# Patient Record
Sex: MALE | Race: BLACK OR AFRICAN AMERICAN | Employment: OTHER | URBAN - METROPOLITAN AREA
[De-identification: names, ages, dates, MRNs, and addresses within clinical notes are randomized per-mention and may not be internally consistent; named-entity substitution may affect disease eponyms.]

---

## 2021-07-28 ENCOUNTER — HOSPITAL ENCOUNTER (EMERGENCY)
Age: 71
Discharge: HOME | End: 2021-07-28
Attending: EMERGENCY MEDICINE
Payer: MEDICARE

## 2021-07-28 VITALS
TEMPERATURE: 97.8 F | BODY MASS INDEX: 21.84 KG/M2 | DIASTOLIC BLOOD PRESSURE: 72 MMHG | RESPIRATION RATE: 16 BRPM | HEART RATE: 50 BPM | SYSTOLIC BLOOD PRESSURE: 161 MMHG | WEIGHT: 161 LBS | OXYGEN SATURATION: 100 %

## 2021-07-28 DIAGNOSIS — N50.89 MALE GENITAL LESION: ICD-10-CM

## 2021-07-28 DIAGNOSIS — N39.0 ACUTE UTI: Primary | ICD-10-CM

## 2021-07-28 LAB
APPEARANCE UR: CLEAR
BILIRUB UR QL: NEGATIVE
COLOR UR: YELLOW
GLUCOSE UR STRIP.AUTO-MCNC: NEGATIVE MG/DL
HGB UR QL STRIP: ABNORMAL
KETONES UR QL STRIP.AUTO: NEGATIVE MG/DL
LEUKOCYTE ESTERASE UR QL STRIP.AUTO: ABNORMAL
NITRITE UR QL STRIP.AUTO: NEGATIVE
PH UR STRIP: 5.5 [PH] (ref 5–8)
PROT UR STRIP-MCNC: NEGATIVE MG/DL
RBC #/AREA URNS HPF: NORMAL /HPF (ref 0–5)
SP GR UR REFRACTOMETRY: 1.02 (ref 1–1.03)
UROBILINOGEN UR QL STRIP.AUTO: 1 EU/DL (ref 0.2–1)
WBC URNS QL MICRO: NORMAL /HPF (ref 0–4)

## 2021-07-28 PROCEDURE — 86780 TREPONEMA PALLIDUM: CPT

## 2021-07-28 PROCEDURE — 87591 N.GONORRHOEAE DNA AMP PROB: CPT

## 2021-07-28 PROCEDURE — 87661 TRICHOMONAS VAGINALIS AMPLIF: CPT

## 2021-07-28 PROCEDURE — 99283 EMERGENCY DEPT VISIT LOW MDM: CPT

## 2021-07-28 PROCEDURE — 74011250636 HC RX REV CODE- 250/636: Performed by: EMERGENCY MEDICINE

## 2021-07-28 PROCEDURE — 96372 THER/PROPH/DIAG INJ SC/IM: CPT

## 2021-07-28 PROCEDURE — 81001 URINALYSIS AUTO W/SCOPE: CPT

## 2021-07-28 PROCEDURE — 87255 GENET VIRUS ISOLATE HSV: CPT

## 2021-07-28 PROCEDURE — 86592 SYPHILIS TEST NON-TREP QUAL: CPT

## 2021-07-28 RX ORDER — CEPHALEXIN 500 MG/1
500 CAPSULE ORAL 2 TIMES DAILY
Qty: 14 CAPSULE | Refills: 0 | Status: SHIPPED | OUTPATIENT
Start: 2021-07-28 | End: 2021-08-04

## 2021-07-28 RX ORDER — VALACYCLOVIR HYDROCHLORIDE 1 G/1
1000 TABLET, FILM COATED ORAL 3 TIMES DAILY
Qty: 30 TABLET | Refills: 0 | Status: SHIPPED | OUTPATIENT
Start: 2021-07-28 | End: 2021-08-07

## 2021-07-28 RX ADMIN — PENICILLIN G BENZATHINE 2.4 MILLION UNITS: 2400000 INJECTION, SUSPENSION INTRAMUSCULAR at 12:47

## 2021-07-28 NOTE — ROUTINE PROCESS
Thompson Dye is a 72 y.o. male that was discharged in stable. Pt was accompanied by self. Pt is driving. The patients diagnosis, condition and treatment were explained to  patient and aftercare instructions were given. The patient verbalized understanding. Patient armband removed and shredded.

## 2021-07-28 NOTE — LETTER
[facility]   Emergency Departments      8/28/2021  [address] [address] [address] [place] [address]    Dear Adilene Castrejon were recently seen in the Emergency Department of [facility] and had lab work performed. We would like to discuss these results with you. Please call [facility] Emergency Department as soon as possible at [phone] to speak with one of our providers.      Sincerely,       Sondra Suarez PA-C

## 2021-07-28 NOTE — ED PROVIDER NOTES
EMERGENCY DEPARTMENT HISTORY AND PHYSICAL EXAM    Date: 7/28/2021  Patient Name: Jerald Gonzales    History of Presenting Illness     Chief Complaint   Patient presents with    Urinary Pain         History Provided By: Patient    Additional History (Context): Jerald Gonzales is a 72 y.o. male with No significant past medical history who presents with urea and genital lesions x10 days. He said he was with his partner for 15 years until they took a break last year because she was sleeping around with other people. They got back together in May now he has been exposed to trichomonas and for the past 10 days has had 2 areas of pain on his penis. He is also having pain when he voids. Denies rash elsewhere where inguinal lymphadenopathy or new arthralgias. PCP: None    Current Outpatient Medications   Medication Sig Dispense Refill    valACYclovir (VALTREX) 1 gram tablet Take 1 Tablet by mouth three (3) times daily for 10 days. 30 Tablet 0    cephALEXin (Keflex) 500 mg capsule Take 1 Capsule by mouth two (2) times a day for 7 days. 14 Capsule 0       Past History     Past Medical History:  History reviewed. No pertinent past medical history. Past Surgical History:  Past Surgical History:   Procedure Laterality Date    HX ORTHOPAEDIC      Rt knee    HX TONSILLECTOMY         Family History:  History reviewed. No pertinent family history. Social History:  Social History     Tobacco Use    Smoking status: Current Every Day Smoker     Packs/day: 2.00     Years: 40.00     Pack years: 80.00   Substance Use Topics    Alcohol use: Yes     Alcohol/week: 0.0 standard drinks     Types: 5 - 6 Cans of beer per week    Drug use: No       Allergies:  No Known Allergies      Review of Systems   Review of Systems   Constitutional: Negative for fever and unexpected weight change. Genitourinary: Positive for dysuria and genital sores. Negative for discharge.      All Other Systems Negative  Physical Exam     Vitals: 07/28/21 1210   BP: (!) 161/72   Pulse: (!) 50   Resp: 16   Temp: 97.8 °F (36.6 °C)   SpO2: 100%   Weight: 73 kg (161 lb)     Physical Exam  Vitals and nursing note reviewed. Constitutional:       General: He is not in acute distress. Appearance: He is well-developed. He is not ill-appearing, toxic-appearing or diaphoretic. HENT:      Head: Normocephalic and atraumatic. Neck:      Thyroid: No thyromegaly. Vascular: No carotid bruit. Trachea: No tracheal deviation. Cardiovascular:      Rate and Rhythm: Normal rate and regular rhythm. Heart sounds: Normal heart sounds. No murmur heard. No friction rub. No gallop. Pulmonary:      Effort: Pulmonary effort is normal. No respiratory distress. Breath sounds: Normal breath sounds. No stridor. No wheezing or rales. Chest:      Chest wall: No tenderness. Abdominal:      General: There is no distension. Palpations: Abdomen is soft. There is no mass. Tenderness: There is no abdominal tenderness. There is no guarding or rebound. Genitourinary:     Comments: At the margin of the penis shaft on the dorsal aspect merging onto the glans penis there is a slight separation of contour line with a very tender chancre type lesion with 2 smaller surrounding sores. Additionally at the dorsal aspect of his urethra there is erosion and chancre that is also tender. Each area is at least 3 to 4 mm in size. Musculoskeletal:         General: Normal range of motion. Cervical back: Normal range of motion and neck supple. Skin:     General: Skin is warm and dry. Coloration: Skin is not pale. Neurological:      Mental Status: He is alert. Psychiatric:         Speech: Speech normal.         Behavior: Behavior normal.         Thought Content:  Thought content normal.         Judgment: Judgment normal.            Diagnostic Study Results     Labs -     Recent Results (from the past 12 hour(s))   URINALYSIS W/ RFLX MICROSCOPIC Collection Time: 07/28/21 12:15 PM   Result Value Ref Range    Color YELLOW      Appearance CLEAR      Specific gravity 1.016 1.005 - 1.030      pH (UA) 5.5 5.0 - 8.0      Protein Negative NEG mg/dL    Glucose Negative NEG mg/dL    Ketone Negative NEG mg/dL    Bilirubin Negative NEG      Blood SMALL (A) NEG      Urobilinogen 1.0 0.2 - 1.0 EU/dL    Nitrites Negative NEG      Leukocyte Esterase MODERATE (A) NEG     URINE MICROSCOPIC ONLY    Collection Time: 07/28/21 12:15 PM   Result Value Ref Range    WBC 11 to 20 0 - 4 /hpf    RBC 4 to 10 0 - 5 /hpf       Radiologic Studies -   No orders to display     CT Results  (Last 48 hours)    None        CXR Results  (Last 48 hours)    None            Medical Decision Making   I am the first provider for this patient. I reviewed the vital signs, available nursing notes, past medical history, past surgical history, family history and social history. Vital Signs-Reviewed the patient's vital signs. Records Reviewed: Nursing Notes    Procedures:  Procedures    Provider Notes (Medical Decision Making): [facility]. Has a UTI. 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 Have sent for HSV swab serum T palladium sent and given prophylaxis for syphilis here. Will prescribe Valtrex and Keflex. Pt counseled against sexual intercourse and to inform partner of visit here today. MED RECONCILIATION:  No current facility-administered medications for this encounter. Current Outpatient Medications   Medication Sig    valACYclovir (VALTREX) 1 gram tablet Take 1 Tablet by mouth three (3) times daily for 10 days.  cephALEXin (Keflex) 500 mg capsule Take 1 Capsule by mouth two (2) times a day for 7 days. Disposition:  home    DISCHARGE NOTE:   12:58 PM    Pt has been reexamined. Patient has no new complaints, changes, or physical findings. Care plan outlined and precautions discussed. Results of labs were reviewed with the patient.  All medications were reviewed with the patient; will d/c home with keflex, valtrex. All of pt's questions and concerns were addressed. Patient was instructed and agrees to follow up with urology, HD, as well as to return to the ED upon further deterioration. Patient is ready to go home. Follow-up Information     Follow up With Specialties Details Why Contact Info    [facility]  Schedule an appointment as soon as possible for a visit in 1 day  [address] [facility] [address]  Schedule an appointment as soon as possible for a visit in 1 day  [address] [address]  [facility]  Schedule an appointment as soon as possible for a visit in 1 day  [place] [facility]          Current Discharge Medication List      START taking these medications    Details   valACYclovir (VALTREX) 1 gram tablet Take 1 Tablet by mouth three (3) times daily for 10 days. Qty: 30 Tablet, Refills: 0  Start date: 7/28/2021, End date: 8/7/2021      cephALEXin (Keflex) 500 mg capsule Take 1 Capsule by mouth two (2) times a day for 7 days. Qty: 14 Capsule, Refills: 0  Start date: 7/28/2021, End date: 8/4/2021               Diagnosis     Clinical Impression:   1. Acute UTI    2.  Male genital lesion

## 2021-07-30 LAB
RPR SER QL: ABNORMAL
T PALLIDUM AB SER QL IA: ABNORMAL

## 2021-08-01 LAB
C TRACH RRNA SPEC QL NAA+PROBE: NEGATIVE
HSV SPEC CULT: NORMAL
N GONORRHOEA RRNA SPEC QL NAA+PROBE: NEGATIVE
SPECIMEN SOURCE: NORMAL
SPECIMEN SOURCE: NORMAL
T VAGINALIS RRNA VAG QL NAA+PROBE: NEGATIVE

## 2021-08-28 NOTE — PROGRESS NOTES
Attempted to call the pt at 7:57 PM.  Patient was treated with valacyclovir but was not treated for syphilis at the time of the ED visit. No visible notes in the chart related to notification of this result. Patient needs to return to the emergency department and be treated for syphilis. No answer was obtained on the phone number in his chart. We will send a letter to the patient's home address on file.  Sondra Suarez PA-C

## 2021-09-08 NOTE — ED NOTES
Pt returned phone call. Notes he was notified of positive syphilis result last month, [facility]. Notes he received repeat blood work and has follow-up with them this week as well. No further questions or concerns.

## 2023-10-28 ENCOUNTER — HOSPITAL ENCOUNTER (EMERGENCY)
Facility: HOSPITAL | Age: 73
Discharge: HOME | End: 2023-10-29
Attending: STUDENT IN AN ORGANIZED HEALTH CARE EDUCATION/TRAINING PROGRAM

## 2023-10-28 ENCOUNTER — APPOINTMENT (OUTPATIENT)
Facility: HOSPITAL | Age: 73
End: 2023-10-28

## 2023-10-28 VITALS
OXYGEN SATURATION: 98 % | DIASTOLIC BLOOD PRESSURE: 68 MMHG | RESPIRATION RATE: 16 BRPM | SYSTOLIC BLOOD PRESSURE: 129 MMHG | HEART RATE: 101 BPM | TEMPERATURE: 98.1 F

## 2023-10-28 DIAGNOSIS — Z23 TETANUS-DIPHTHERIA VACCINATION ADMINISTERED AT CURRENT VISIT: ICD-10-CM

## 2023-10-28 DIAGNOSIS — S01.81XA FACIAL LACERATION, INITIAL ENCOUNTER: Primary | ICD-10-CM

## 2023-10-28 PROCEDURE — 12011 RPR F/E/E/N/L/M 2.5 CM/<: CPT

## 2023-10-28 PROCEDURE — 99284 EMERGENCY DEPT VISIT MOD MDM: CPT

## 2023-10-28 ASSESSMENT — PAIN DESCRIPTION - LOCATION: LOCATION: HEAD

## 2023-10-28 ASSESSMENT — PAIN - FUNCTIONAL ASSESSMENT: PAIN_FUNCTIONAL_ASSESSMENT: 0-10

## 2023-10-28 ASSESSMENT — PAIN SCALES - GENERAL: PAINLEVEL_OUTOF10: 3

## 2023-10-29 ENCOUNTER — APPOINTMENT (OUTPATIENT)
Facility: HOSPITAL | Age: 73
End: 2023-10-29

## 2023-10-29 PROCEDURE — 70450 CT HEAD/BRAIN W/O DYE: CPT

## 2023-10-29 PROCEDURE — 90714 TD VACC NO PRESV 7 YRS+ IM: CPT

## 2023-10-29 PROCEDURE — 6360000002 HC RX W HCPCS

## 2023-10-29 PROCEDURE — 90471 IMMUNIZATION ADMIN: CPT

## 2023-10-29 RX ADMIN — CLOSTRIDIUM TETANI TOXOID ANTIGEN (FORMALDEHYDE INACTIVATED) AND CORYNEBACTERIUM DIPHTHERIAE TOXOID ANTIGEN (FORMALDEHYDE INACTIVATED) 0.5 ML: 5; 2 INJECTION, SUSPENSION INTRAMUSCULAR at 00:30

## 2023-10-29 ASSESSMENT — ENCOUNTER SYMPTOMS
RESPIRATORY NEGATIVE: 1
EYES NEGATIVE: 1
GASTROINTESTINAL NEGATIVE: 1

## 2023-10-29 NOTE — ED NOTES
Patient returned from [facility], [facility] RN  10/29/23 6621
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
## 2023-11-13 ENCOUNTER — HOSPITAL ENCOUNTER (EMERGENCY)
Facility: HOSPITAL | Age: 73
Discharge: HOME | End: 2023-11-13
Attending: EMERGENCY MEDICINE
Payer: MEDICARE

## 2023-11-13 VITALS
WEIGHT: 168 LBS | BODY MASS INDEX: 22.75 KG/M2 | DIASTOLIC BLOOD PRESSURE: 76 MMHG | HEIGHT: 72 IN | SYSTOLIC BLOOD PRESSURE: 156 MMHG | TEMPERATURE: 98.3 F | OXYGEN SATURATION: 99 % | HEART RATE: 45 BPM | RESPIRATION RATE: 18 BRPM

## 2023-11-13 DIAGNOSIS — Z48.02 ENCOUNTER FOR REMOVAL OF SUTURES: Primary | ICD-10-CM

## 2023-11-13 DIAGNOSIS — R03.0 ELEVATED BLOOD PRESSURE READING: ICD-10-CM

## 2023-11-13 DIAGNOSIS — R00.1 ASYMPTOMATIC BRADYCARDIA: ICD-10-CM

## 2023-11-13 LAB
EKG ATRIAL RATE: 45 BPM
EKG DIAGNOSIS: NORMAL
EKG P AXIS: 102 DEGREES
EKG P-R INTERVAL: 134 MS
EKG Q-T INTERVAL: 432 MS
EKG QRS DURATION: 80 MS
EKG QTC CALCULATION (BAZETT): 373 MS
EKG R AXIS: 64 DEGREES
EKG T AXIS: 57 DEGREES
EKG VENTRICULAR RATE: 45 BPM

## 2023-11-13 PROCEDURE — 93005 ELECTROCARDIOGRAM TRACING: CPT | Performed by: EMERGENCY MEDICINE

## 2023-11-13 PROCEDURE — 93010 ELECTROCARDIOGRAM REPORT: CPT | Performed by: INTERNAL MEDICINE

## 2023-11-13 PROCEDURE — 99283 EMERGENCY DEPT VISIT LOW MDM: CPT

## 2023-11-13 NOTE — ED TRIAGE NOTES
PT in ED with c/o needing his sutures removed that were placed 10/28. Pts HR currently 45. EKG completed.  Pt currently Asymptomatic

## 2023-11-13 NOTE — CARE COORDINATION
Patient has transitional care follow up with [address] [place], [place] [address]T[facility] on 11/22/2023 at 9:45 am.

## 2023-11-13 NOTE — ED PROVIDER NOTES
Emergency Department Treatment Report    Patient: Annabel Kanner Age: 71 y.o. Sex: male    YOB: 1950 Admit Date: 11/13/2023 PCP: No primary care provider on file. MRN: [id number]  CSN: [id number]     Room: [address] [place] [address] Time Dictated: 3:40 PM        Chief Complaint   Chief Complaint   Patient presents with    Suture / Staple Removal       History of Present Illness   Annabel Kanner is a 71 y.o. male with no past medical history who presents to the ED with the chief complaint of suture removal. Patient drove to ED himself. Currently does not have primary care to follow up with. No current medications. Denies any symptoms of fever, lightheadedness, constipation, dizziness, visual disturbances. Review of Systems   Constitutional: Negative for fever, chills, and fatigue. HENT: Negative for congestion, sore throat, rhinorrhea, sneezing and neck stiffness   Eyes: Negative for discharge and redness. Respiratory: Negative for  shortness of breath, wheezing   Cardiovascular: Negative for chest pain, palpitations   Gastrointestinal: Negative for nausea, vomiting, abdominal pain, constipation, diarrhea and blood in stool. Genitourinary: Negative for dysuria, urgency, frequency, hematuria, flank pain, decreased urine volume, discharge,   Musculoskeletal: Negative for myalgias or joint pain . Skin: Negative for rash or lesions . Neurological: Negative weakness, light-headedness, numbness and headaches. Past Medical/Surgical History     Past Medical History:   Diagnosis Date    Arthropathy      Past Surgical History:   Procedure Laterality Date    ORTHOPEDIC SURGERY      Rt knee    TONSILLECTOMY         Social History     Social History     Socioeconomic History    Marital status: Single   Tobacco Use    Smoking status: Every Day     Packs/day: 2     Types: Cigarettes   Substance and Sexual Activity    Alcohol use:  Yes     Alcohol/week: 0.0 standard drinks of alcohol    Drug use: No       Family

## 2024-07-08 ENCOUNTER — HOSPITAL ENCOUNTER (EMERGENCY)
Facility: HOSPITAL | Age: 74
Discharge: HOME | End: 2024-07-08
Attending: STUDENT IN AN ORGANIZED HEALTH CARE EDUCATION/TRAINING PROGRAM
Payer: MEDICARE

## 2024-07-08 VITALS
OXYGEN SATURATION: 99 % | WEIGHT: 171 LBS | HEIGHT: 72 IN | HEART RATE: 37 BPM | SYSTOLIC BLOOD PRESSURE: 149 MMHG | RESPIRATION RATE: 18 BRPM | TEMPERATURE: 98 F | DIASTOLIC BLOOD PRESSURE: 64 MMHG | BODY MASS INDEX: 23.16 KG/M2

## 2024-07-08 DIAGNOSIS — R10.9 LEFT SIDED ABDOMINAL PAIN: Primary | ICD-10-CM

## 2024-07-08 LAB
ALBUMIN SERPL-MCNC: 3.9 G/DL (ref 3.4–5)
ALBUMIN/GLOB SERPL: 1.1 (ref 0.8–1.7)
ALP SERPL-CCNC: 56 U/L (ref 45–117)
ALT SERPL-CCNC: 24 U/L (ref 16–61)
ANION GAP SERPL CALC-SCNC: 5 MMOL/L (ref 3–18)
APPEARANCE UR: CLEAR
AST SERPL-CCNC: 19 U/L (ref 10–38)
BASOPHILS # BLD: 0.1 K/UL (ref 0–0.1)
BASOPHILS NFR BLD: 1 % (ref 0–2)
BILIRUB SERPL-MCNC: 1.1 MG/DL (ref 0.2–1)
BILIRUB UR QL: NEGATIVE
BUN SERPL-MCNC: 11 MG/DL (ref 7–18)
BUN/CREAT SERPL: 11 (ref 12–20)
CALCIUM SERPL-MCNC: 10.3 MG/DL (ref 8.5–10.1)
CHLORIDE SERPL-SCNC: 109 MMOL/L (ref 100–111)
CO2 SERPL-SCNC: 26 MMOL/L (ref 21–32)
COLOR UR: YELLOW
CREAT SERPL-MCNC: 1.03 MG/DL (ref 0.6–1.3)
DIFFERENTIAL METHOD BLD: NORMAL
EOSINOPHIL # BLD: 0.2 K/UL (ref 0–0.4)
EOSINOPHIL NFR BLD: 3 % (ref 0–5)
ERYTHROCYTE [DISTWIDTH] IN BLOOD BY AUTOMATED COUNT: 14.3 % (ref 11.6–14.5)
GLOBULIN SER CALC-MCNC: 3.6 G/DL (ref 2–4)
GLUCOSE SERPL-MCNC: 127 MG/DL (ref 74–99)
GLUCOSE UR STRIP.AUTO-MCNC: NEGATIVE MG/DL
HCT VFR BLD AUTO: 42.2 % (ref 36–48)
HGB BLD-MCNC: 14.4 G/DL (ref 13–16)
HGB UR QL STRIP: ABNORMAL
IMM GRANULOCYTES # BLD AUTO: 0 K/UL (ref 0–0.04)
IMM GRANULOCYTES NFR BLD AUTO: 0 % (ref 0–0.5)
KETONES UR QL STRIP.AUTO: NEGATIVE MG/DL
LEUKOCYTE ESTERASE UR QL STRIP.AUTO: NEGATIVE
LIPASE SERPL-CCNC: 28 U/L (ref 13–75)
LYMPHOCYTES # BLD: 3.4 K/UL (ref 0.9–3.6)
LYMPHOCYTES NFR BLD: 44 % (ref 21–52)
MCH RBC QN AUTO: 29.3 PG (ref 24–34)
MCHC RBC AUTO-ENTMCNC: 34.1 G/DL (ref 31–37)
MCV RBC AUTO: 85.9 FL (ref 78–100)
MONOCYTES # BLD: 0.7 K/UL (ref 0.05–1.2)
MONOCYTES NFR BLD: 9 % (ref 3–10)
NEUTS SEG # BLD: 3.3 K/UL (ref 1.8–8)
NEUTS SEG NFR BLD: 43 % (ref 40–73)
NITRITE UR QL STRIP.AUTO: NEGATIVE
NRBC # BLD: 0 K/UL (ref 0–0.01)
NRBC BLD-RTO: 0 PER 100 WBC
PH UR STRIP: 5 (ref 5–8)
PLATELET # BLD AUTO: 283 K/UL (ref 135–420)
PMV BLD AUTO: 11.2 FL (ref 9.2–11.8)
POTASSIUM SERPL-SCNC: 3.9 MMOL/L (ref 3.5–5.5)
PROT SERPL-MCNC: 7.5 G/DL (ref 6.4–8.2)
PROT UR STRIP-MCNC: NEGATIVE MG/DL
RBC # BLD AUTO: 4.91 M/UL (ref 4.35–5.65)
RBC #/AREA URNS HPF: NORMAL /HPF (ref 0–5)
SODIUM SERPL-SCNC: 140 MMOL/L (ref 136–145)
SP GR UR REFRACTOMETRY: 1.02 (ref 1–1.03)
UROBILINOGEN UR QL STRIP.AUTO: 1 EU/DL (ref 0.2–1)
WBC # BLD AUTO: 7.8 K/UL (ref 4.6–13.2)

## 2024-07-08 PROCEDURE — 80053 COMPREHEN METABOLIC PANEL: CPT

## 2024-07-08 PROCEDURE — 87491 CHLMYD TRACH DNA AMP PROBE: CPT

## 2024-07-08 PROCEDURE — 83690 ASSAY OF LIPASE: CPT

## 2024-07-08 PROCEDURE — 87661 TRICHOMONAS VAGINALIS AMPLIF: CPT

## 2024-07-08 PROCEDURE — 85025 COMPLETE CBC W/AUTO DIFF WBC: CPT

## 2024-07-08 PROCEDURE — 81001 URINALYSIS AUTO W/SCOPE: CPT

## 2024-07-08 PROCEDURE — 99283 EMERGENCY DEPT VISIT LOW MDM: CPT

## 2024-07-08 PROCEDURE — 87591 N.GONORRHOEAE DNA AMP PROB: CPT

## 2024-07-08 ASSESSMENT — ENCOUNTER SYMPTOMS
DIARRHEA: 0
ABDOMINAL PAIN: 1
CHEST TIGHTNESS: 0
VOMITING: 0
SHORTNESS OF BREATH: 0
NAUSEA: 0
SORE THROAT: 0

## 2024-07-08 ASSESSMENT — LIFESTYLE VARIABLES
HOW OFTEN DO YOU HAVE A DRINK CONTAINING ALCOHOL: 4 OR MORE TIMES A WEEK
HOW MANY STANDARD DRINKS CONTAINING ALCOHOL DO YOU HAVE ON A TYPICAL DAY: 5 OR 6

## 2024-07-08 ASSESSMENT — PAIN DESCRIPTION - PAIN TYPE: TYPE: ACUTE PAIN

## 2024-07-08 ASSESSMENT — PAIN SCALES - GENERAL: PAINLEVEL_OUTOF10: 0

## 2024-07-08 ASSESSMENT — PAIN - FUNCTIONAL ASSESSMENT: PAIN_FUNCTIONAL_ASSESSMENT: 0-10

## 2024-07-08 NOTE — ED NOTES
RN called lab to notify them about the add-on testing to the urine sample. Spoke with Juan Ramon Barker

## 2024-07-08 NOTE — ED TRIAGE NOTES
Left upper flank pain, left lower back pain, increased pain in the morning, patient states that it burns in the morning when he urinates. Denies hematuria, difficulty urinating    Last known drink: 07/07/2024 1900

## 2024-07-08 NOTE — ED PROVIDER NOTES
EMERGENCY DEPARTMENT HISTORY AND PHYSICAL EXAM      Date: 7/8/2024  Patient Name: Juan Antonio Riojas    History of Presenting Illness     Chief Complaint   Patient presents with    Dysuria    Flank Pain       Is a 73-year-old male presenting to the emergency department with complaints of left upper quadrant/left flank pain.  Patient states has been ongoing for months.  Seems to be intermittent.  States the last time he felt this pain was 3 days ago.  He denies any trauma.  Denies any hematuria, dysuria, NVD            PCP: Braydon Garcia Jr., APRN - NP    No current facility-administered medications for this encounter.     No current outpatient medications on file.       Past History     Past Medical History:  Past Medical History:   Diagnosis Date    Alcohol abuse     Arthropathy        Past Surgical History:  Past Surgical History:   Procedure Laterality Date    ORTHOPEDIC SURGERY      Rt knee    TONSILLECTOMY         Family History:  History reviewed. No pertinent family history.    Social History:  Social History     Tobacco Use    Smoking status: Every Day     Current packs/day: 2.00     Types: Cigarettes   Vaping Use    Vaping Use: Never used   Substance Use Topics    Alcohol use: Yes     Alcohol/week: 0.0 standard drinks of alcohol    Drug use: No       Allergies:  No Known Allergies      Review of Systems       Review of Systems   Constitutional:  Negative for activity change, appetite change, fatigue and fever.   HENT:  Negative for congestion and sore throat.    Respiratory:  Negative for chest tightness and shortness of breath.    Cardiovascular:  Negative for chest pain.   Gastrointestinal:  Positive for abdominal pain (LUQ). Negative for diarrhea, nausea and vomiting.   Genitourinary:  Positive for flank pain (L flank). Negative for difficulty urinating, dysuria, hematuria and urgency.   Musculoskeletal:  Negative for arthralgias.   Skin:  Negative for rash.   Neurological:  Negative for dizziness,

## 2024-07-09 LAB
C TRACH RRNA SPEC QL NAA+PROBE: NEGATIVE
N GONORRHOEA RRNA SPEC QL NAA+PROBE: NEGATIVE
SPECIMEN SOURCE: NORMAL
T VAGINALIS RRNA SPEC QL NAA+PROBE: NEGATIVE